# Patient Record
Sex: MALE | Race: WHITE | NOT HISPANIC OR LATINO | URBAN - METROPOLITAN AREA
[De-identification: names, ages, dates, MRNs, and addresses within clinical notes are randomized per-mention and may not be internally consistent; named-entity substitution may affect disease eponyms.]

---

## 2020-06-04 ENCOUNTER — OFFICE VISIT - RIVER FALLS (OUTPATIENT)
Dept: FAMILY MEDICINE | Facility: CLINIC | Age: 45
End: 2020-06-04

## 2020-06-12 ENCOUNTER — OFFICE VISIT - RIVER FALLS (OUTPATIENT)
Dept: FAMILY MEDICINE | Facility: CLINIC | Age: 45
End: 2020-06-12

## 2020-06-12 ENCOUNTER — COMMUNICATION - RIVER FALLS (OUTPATIENT)
Dept: FAMILY MEDICINE | Facility: CLINIC | Age: 45
End: 2020-06-12

## 2020-06-12 LAB
ANION GAP SERPL CALCULATED.3IONS-SCNC: 9 MMOL/L (ref 5–18)
BUN SERPL-MCNC: 15 MG/DL (ref 8–25)
BUN/CREAT RATIO - HISTORICAL: 18 (ref 10–20)
CALCIUM SERPL-MCNC: 8.9 MG/DL (ref 8.5–10.5)
CHLORIDE BLD-SCNC: 106 MMOL/L (ref 98–110)
CO2 SERPL-SCNC: 24 MMOL/L (ref 21–31)
CREAT SERPL-MCNC: 0.84 MG/DL (ref 0.72–1.25)
ERYTHROCYTE [DISTWIDTH] IN BLOOD BY AUTOMATED COUNT: 12.8 % (ref 11.5–15.5)
GFR ESTIMATE EXT - HISTORICAL: >60
GLUCOSE BLD-MCNC: 119 MG/DL (ref 65–100)
HCT VFR BLD AUTO: 46.2 % (ref 37–53)
HGB BLD-MCNC: 16.5 G/DL (ref 13.5–17.5)
MCH RBC QN AUTO: 31.3 PG (ref 26–34)
MCHC RBC AUTO-ENTMCNC: 35.7 G/DL (ref 32–36)
MCV RBC AUTO: 88 FL (ref 80–100)
PLATELET # BLD AUTO: 160 10*3/UL (ref 140–440)
PMV BLD: 9.9 FL (ref 6.5–11)
POTASSIUM BLD-SCNC: 4.3 MMOL/L (ref 3.5–5)
RBC # BLD AUTO: 5.28 10*6/UL (ref 4.3–5.9)
SODIUM SERPL-SCNC: 139 MMOL/L (ref 135–145)
WBC # BLD AUTO: 6.5 10*3/UL (ref 4.5–11)

## 2020-06-12 ASSESSMENT — MIFFLIN-ST. JEOR: SCORE: 1841.77

## 2020-06-15 ENCOUNTER — COMMUNICATION - RIVER FALLS (OUTPATIENT)
Dept: FAMILY MEDICINE | Facility: CLINIC | Age: 45
End: 2020-06-15

## 2021-05-14 ENCOUNTER — OFFICE VISIT - RIVER FALLS (OUTPATIENT)
Dept: FAMILY MEDICINE | Facility: CLINIC | Age: 46
End: 2021-05-14

## 2021-05-17 LAB — SARS-COV-2 RNA RESP QL NAA+PROBE: POSITIVE

## 2021-05-27 ENCOUNTER — OFFICE VISIT - RIVER FALLS (OUTPATIENT)
Dept: FAMILY MEDICINE | Facility: CLINIC | Age: 46
End: 2021-05-27

## 2021-05-28 ENCOUNTER — COMMUNICATION - RIVER FALLS (OUTPATIENT)
Dept: FAMILY MEDICINE | Facility: CLINIC | Age: 46
End: 2021-05-28

## 2021-05-28 LAB
BUN SERPL-MCNC: 16 MG/DL (ref 7–25)
BUN/CREAT RATIO - HISTORICAL: ABNORMAL (ref 6–22)
CALCIUM SERPL-MCNC: 9.2 MG/DL (ref 8.6–10.3)
CHLORIDE BLD-SCNC: 98 MMOL/L (ref 98–110)
CO2 SERPL-SCNC: 28 MMOL/L (ref 20–32)
CREAT SERPL-MCNC: 0.8 MG/DL (ref 0.6–1.35)
EGFRCR SERPLBLD CKD-EPI 2021: 109 ML/MIN/1.73M2
GLUCOSE BLD-MCNC: 131 MG/DL (ref 65–99)
POTASSIUM BLD-SCNC: 4.3 MMOL/L (ref 3.5–5.3)
SODIUM SERPL-SCNC: 136 MMOL/L (ref 135–146)

## 2021-06-10 ENCOUNTER — OFFICE VISIT - RIVER FALLS (OUTPATIENT)
Dept: FAMILY MEDICINE | Facility: CLINIC | Age: 46
End: 2021-06-10

## 2021-06-24 ENCOUNTER — OFFICE VISIT - RIVER FALLS (OUTPATIENT)
Dept: FAMILY MEDICINE | Facility: CLINIC | Age: 46
End: 2021-06-24

## 2021-07-08 ENCOUNTER — AMBULATORY - RIVER FALLS (OUTPATIENT)
Dept: FAMILY MEDICINE | Facility: CLINIC | Age: 46
End: 2021-07-08

## 2021-07-08 ENCOUNTER — OFFICE VISIT - RIVER FALLS (OUTPATIENT)
Dept: FAMILY MEDICINE | Facility: CLINIC | Age: 46
End: 2021-07-08

## 2021-07-08 ASSESSMENT — MIFFLIN-ST. JEOR: SCORE: 1898.13

## 2021-07-20 ENCOUNTER — AMBULATORY - RIVER FALLS (OUTPATIENT)
Dept: FAMILY MEDICINE | Facility: CLINIC | Age: 46
End: 2021-07-20

## 2021-07-22 ENCOUNTER — OFFICE VISIT - RIVER FALLS (OUTPATIENT)
Dept: FAMILY MEDICINE | Facility: CLINIC | Age: 46
End: 2021-07-22

## 2021-07-27 ENCOUNTER — COMMUNICATION - RIVER FALLS (OUTPATIENT)
Dept: FAMILY MEDICINE | Facility: CLINIC | Age: 46
End: 2021-07-27

## 2021-07-27 ENCOUNTER — OFFICE VISIT - RIVER FALLS (OUTPATIENT)
Dept: FAMILY MEDICINE | Facility: CLINIC | Age: 46
End: 2021-07-27

## 2021-07-27 ASSESSMENT — MIFFLIN-ST. JEOR: SCORE: 1879.99

## 2021-08-05 ENCOUNTER — OFFICE VISIT - RIVER FALLS (OUTPATIENT)
Dept: FAMILY MEDICINE | Facility: CLINIC | Age: 46
End: 2021-08-05

## 2021-08-05 ENCOUNTER — AMBULATORY - RIVER FALLS (OUTPATIENT)
Dept: FAMILY MEDICINE | Facility: CLINIC | Age: 46
End: 2021-08-05

## 2022-02-12 VITALS
SYSTOLIC BLOOD PRESSURE: 138 MMHG | DIASTOLIC BLOOD PRESSURE: 86 MMHG | BODY MASS INDEX: 30.09 KG/M2 | HEART RATE: 80 BPM | HEIGHT: 70 IN | WEIGHT: 210.2 LBS

## 2022-02-12 VITALS
BODY MASS INDEX: 30.92 KG/M2 | HEART RATE: 87 BPM | HEART RATE: 100 BPM | TEMPERATURE: 97.9 F | TEMPERATURE: 97.9 F | RESPIRATION RATE: 20 BRPM | DIASTOLIC BLOOD PRESSURE: 82 MMHG | SYSTOLIC BLOOD PRESSURE: 122 MMHG | OXYGEN SATURATION: 97 % | WEIGHT: 214 LBS | HEIGHT: 70 IN | TEMPERATURE: 98.2 F | OXYGEN SATURATION: 98 % | RESPIRATION RATE: 16 BRPM | DIASTOLIC BLOOD PRESSURE: 90 MMHG | DIASTOLIC BLOOD PRESSURE: 77 MMHG | SYSTOLIC BLOOD PRESSURE: 112 MMHG | SYSTOLIC BLOOD PRESSURE: 121 MMHG | WEIGHT: 209 LBS | HEART RATE: 94 BPM | HEART RATE: 96 BPM | HEART RATE: 91 BPM | SYSTOLIC BLOOD PRESSURE: 130 MMHG | HEART RATE: 103 BPM | DIASTOLIC BLOOD PRESSURE: 68 MMHG | RESPIRATION RATE: 20 BRPM | BODY MASS INDEX: 30.77 KG/M2 | BODY MASS INDEX: 30.72 KG/M2 | TEMPERATURE: 98.1 F | SYSTOLIC BLOOD PRESSURE: 128 MMHG | WEIGHT: 216 LBS | DIASTOLIC BLOOD PRESSURE: 80 MMHG | TEMPERATURE: 97.5 F | BODY MASS INDEX: 31.5 KG/M2 | WEIGHT: 211 LBS | OXYGEN SATURATION: 98 % | WEIGHT: 215.6 LBS | RESPIRATION RATE: 16 BRPM | WEIGHT: 216 LBS | RESPIRATION RATE: 16 BRPM | OXYGEN SATURATION: 98 % | OXYGEN SATURATION: 96 % | SYSTOLIC BLOOD PRESSURE: 128 MMHG | RESPIRATION RATE: 16 BRPM | HEART RATE: 112 BPM | SYSTOLIC BLOOD PRESSURE: 138 MMHG | DIASTOLIC BLOOD PRESSURE: 72 MMHG | WEIGHT: 220 LBS | OXYGEN SATURATION: 91 % | OXYGEN SATURATION: 97 % | HEIGHT: 70 IN | DIASTOLIC BLOOD PRESSURE: 84 MMHG

## 2022-02-12 VITALS — OXYGEN SATURATION: 88 % | RESPIRATION RATE: 22 BRPM

## 2022-02-15 NOTE — NURSING NOTE
Comprehensive Intake Entered On:  6/10/2021 10:58 AM CDT    Performed On:  6/10/2021 10:50 AM CDT by Ariela Simpson CMA               Summary   Chief Complaint :   Follow up.    Weight Measured :   211 lb(Converted to: 211 lb 0 oz, 95.708 kg)    Height/Length Estimated :   69.5 in(Converted to: 5 ft 9 in, 176.53 cm)    Systolic Blood Pressure :   112 mmHg   Diastolic Blood Pressure :   72 mmHg   Mean Arterial Pressure :   85 mmHg   Peripheral Pulse Rate :   91 bpm   BP Site :   Right arm   BP Method :   Manual   Temperature Tympanic :   98.1 DegF(Converted to: 36.7 DegC)    Respiratory Rate :   20 br/min   Oxygen Saturation :   98 %   Ariela Simpson CMA - 6/10/2021 10:50 AM CDT   Health Status   Allergies Verified? :   Yes   Medication History Verified? :   Yes   Medical History Verified? :   Yes   Pre-Visit Planning Status :   Completed   Tobacco Use? :   Former smoker   Ariela Simpson CMA - 6/10/2021 10:50 AM CDT   Consents   Consent for Immunization Exchange :   Consent Granted   Consent for Immunizations to Providers :   Consent Granted   Ariela Simpson CMA - 6/10/2021 10:50 AM CDT   Meds / Allergies   (As Of: 6/10/2021 10:58:42 AM CDT)   Allergies (Active)   No Known Medication Allergies  Estimated Onset Date:   Unspecified ; Created By:   Nery Casiano MA; Reaction Status:   Active ; Category:   Drug ; Substance:   No Known Medication Allergies ; Type:   Allergy ; Updated By:   Nery Casiano MA; Reviewed Date:   6/10/2021 10:54 AM CDT        Medication List   (As Of: 6/10/2021 10:58:42 AM CDT)   Home Meds    acetaminophen  :   acetaminophen ; Status:   Documented ; Ordered As Mnemonic:   Tylenol 325 mg oral tablet ; Simple Display Line:   650 mg, 2 tab(s), Oral, q4 hrs, PRN: for pain, 120 tab(s), 0 Refill(s) ; Catalog Code:   acetaminophen ; Order Dt/Tm:   5/27/2021 1:30:06 PM CDT          benzonatate  :   benzonatate ; Status:   Documented ; Ordered As Mnemonic:   benzonatate 100 mg oral capsule ; Simple  Display Line:   100 mg, 1 cap(s), Oral, tid, PRN: cough, 30 cap(s), 0 Refill(s) ; Catalog Code:   benzonatate ; Order Dt/Tm:   5/27/2021 1:29:39 PM CDT          calcium carbonate  :   calcium carbonate ; Status:   Documented ; Ordered As Mnemonic:   Tums ; Simple Display Line:   500 mg, Chewed, q4 hrs, PRN: indigestion, 0 Refill(s) ; Catalog Code:   calcium carbonate ; Order Dt/Tm:   5/27/2021 1:30:29 PM CDT          famotidine  :   famotidine ; Status:   Documented ; Ordered As Mnemonic:   famotidine 20 mg oral tablet ; Simple Display Line:   20 mg, 1 tab(s), Oral, bid, 180 tab(s), 0 Refill(s) ; Catalog Code:   famotidine ; Order Dt/Tm:   5/27/2021 1:29:39 PM CDT          guaiFENesin  :   guaiFENesin ; Status:   Documented ; Ordered As Mnemonic:   guaiFENesin 100 mg/5 mL oral liquid ; Simple Display Line:   100 mg, 5 mL, Oral, qid, PRN: for cough, 300 mL, 0 Refill(s) ; Catalog Code:   guaiFENesin ; Order Dt/Tm:   5/27/2021 1:31:49 PM CDT          Miscellaneous Rx Supply  :   Miscellaneous Rx Supply ; Status:   Documented ; Ordered As Mnemonic:   Home Oxygen ; Simple Display Line:   1.5 Liters per minute. Lenght of need: 2 months, 0 Refill(s) ; Catalog Code:   Miscellaneous Rx Supply ; Order Dt/Tm:   5/27/2021 1:32:05 PM CDT          zolpidem  :   zolpidem ; Status:   Documented ; Ordered As Mnemonic:   zolpidem 5 mg oral tablet ; Simple Display Line:   5 mg, 1 tab(s), Oral, hs, PRN: for sleep, 0 Refill(s) ; Catalog Code:   zolpidem ; Order Dt/Tm:   5/27/2021 1:29:39 PM CDT            ID Risk Screen   Recent Travel History :   No recent travel   Family Member Travel History :   No recent travel   Other Exposure to Infectious Disease :   Unknown   COVID-19 Testing Status :   No positive COVID-19 test   Calvin SANDHYA, Ariela - 6/10/2021 10:50 AM CDT

## 2022-02-15 NOTE — NURSING NOTE
Comprehensive Intake Entered On:  7/22/2021 1:27 PM CDT    Performed On:  7/22/2021 1:21 PM CDT by Ariela Simpson CMA               Summary   Chief Complaint :   Post covid follow up. Discuss going back to work. Ongoing SOB   Menstrual Status :   N/A   Weight Measured :   216 lb(Converted to: 216 lb 0 oz, 97.976 kg)    Height/Length Estimated :   69.5 in(Converted to: 5 ft 9 in, 176.53 cm)    Systolic Blood Pressure :   128 mmHg   Diastolic Blood Pressure :   84 mmHg (HI)    Mean Arterial Pressure :   99 mmHg   Peripheral Pulse Rate :   100 bpm   BP Site :   Right arm   BP Method :   Manual   Temperature Tympanic :   98.2 DegF(Converted to: 36.8 DegC)    Respiratory Rate :   16 br/min   Oxygen Saturation :   98 %   Ariela Simpson CMA - 7/22/2021 1:21 PM CDT   Health Status   Allergies Verified? :   Yes   Medication History Verified? :   Yes   Medical History Verified? :   Yes   Pre-Visit Planning Status :   Completed   Tobacco Use? :   Former smoker   Ariela Simpson CMA - 7/22/2021 1:21 PM CDT   Consents   Consent for Immunization Exchange :   Consent Granted   Consent for Immunizations to Providers :   Consent Granted   Ariela Simpson CMA - 7/22/2021 1:21 PM CDT   Meds / Allergies   (As Of: 7/22/2021 1:27:04 PM CDT)   Allergies (Active)   No Known Medication Allergies  Estimated Onset Date:   Unspecified ; Created By:   Nery Casiano MA; Reaction Status:   Active ; Category:   Drug ; Substance:   No Known Medication Allergies ; Type:   Allergy ; Updated By:   Nery Casiano MA; Reviewed Date:   7/22/2021 1:23 PM CDT        Medication List   (As Of: 7/22/2021 1:27:04 PM CDT)   Prescription/Discharge Order    Miscellaneous Rx Supply  :   Miscellaneous Rx Supply ; Status:   Prescribed ; Ordered As Mnemonic:   D/C Oxygen ; Simple Display Line:   See Instructions, DISCONTINUE Oxygen, 1 EA, 0 Refill(s) ; Ordering Provider:   Carl Ron MD; Catalog Code:   Miscellaneous Rx Supply ; Order Dt/Tm:   7/8/2021  9:57:41 AM CDT

## 2022-02-15 NOTE — PROGRESS NOTES
Patient:   RACHEL NEVILLE            MRN: [id number]            FIN: [id number]               Age:   46 years     Sex:  Male     :  1975   Associated Diagnoses:   Cold virus; Suspected COVID-19 virus infection   Author:   Carl Ron MD      Visit Information      Date of Service: 2021 08:53 am  Performing Location: [facility]  Encounter#: [id number]      Primary Care Provider (PCP):  Carl Ron MD    NPI# [id number]      Referring Provider:  Carl Ron MD    NPI# [id number]      Chief Complaint   2021 9:02 AM CDT    Insomnia x6 days. Left side sciatic pain. Multiple covid symptoms marked on screening form.     Chief complaint and symptoms noted above confirmed with patient.      History of Present Illness             The patient presents with nasal congestion.  The location is both sides.  There were exacerbating factors.  It is described as a sensation of pressure.  The symptom occurs intermittently.  The course is worsening.        Review of Systems   Constitutional:  Fever.    Ear/Nose/Mouth/Throat:  Nasal congestion.    Respiratory:  Cough, No wheezing.       Health Status   Allergies:    Allergic Reactions (Selected)  No Known Medication Allergies   Medications:  (Selected)      Problem list:    All Problems  Obesity / SNOMED CT 3848997668 / Probable  Resolved: Tobacco user / ICD-9-.1      Histories   Past Medical History:    Resolved  Tobacco user (ICD-9-.1):  Resolved on 2012 at 37 years.   Family History:    Cancer  Grandmother (M)  [place] - Lung cancer  Brother ()     Procedure history:    L Femur Repair on 2004 at 28 Years.   Social History:        Electronic Cigarette/Vaping Assessment            Electronic Cigarette Use: Never.      Alcohol Assessment            1-2 times per week, 4 drinks/episode average.      Tobacco Assessment: Current            Current, Snuff, Started age 18 Years.            Former  smoker, quit more than 30 days ago      Substance Abuse Assessment: Denies Substance Abuse            Never      Employment and Education Assessment            Employed, Work/School description: .      Home and Environment Assessment            Marital status: .  Spouse/Partner name: Izabella.  Lives with Children, Spouse.  1 children.  Living               situation: Home/Independent.  Smoker in household: Yes.      Nutrition and Health Assessment            Type of diet: Regular.  Caffeine intake amount: Coffee.      Exercise and Physical Activity Assessment: Regular exercise      Sexual Assessment            Sexually active: Yes.  Sexual orientation: Heterosexual.  Other contraceptive use: IUD.        Physical Examination   Documented vital signs:          Pulse: 120  beats per min.         Respiration: 22  breaths/ min.         Oxygen saturation  88  %: Room air.    General:  Alert and oriented.    HENT:  Normocephalic.    Neck:  Supple, Non-tender, No lymphadenopathy.    Respiratory:  Lungs are clear to auscultation.    Cardiovascular:  Normal rate, Regular rhythm.       Impression and Plan   Diagnosis     Cold virus (UBX33-UB J00).     Suspected COVID-19 virus infection (DGX71-NM Z20.822).     Course:  Worsening.    Orders     Orders (Selected)   Outpatient Orders  Ordered  SARS-CoV-2 RNA (COVID-19), Qualitative NAAT (Request): Screening for viral disease.     Will send to [facility] ER for further W/U.  Wife will drive..        Professional Services   Counseling Summary:  This was a 30 minute visit with greater than 50% of that time spent counseling the patient, and coordination of care..    Counseling included abnormal lab results, differential diagnoses, and treatment options.    The patient was interactive, attentive, asked questions, and verbalized understanding.    Family present included spouse.

## 2022-02-15 NOTE — LETTER
(Inserted Image. Unable to display)   July 12, 2021    RACHEL NEVILLE  [address] [place] [address]            Dear RACHEL,      Thank you for selecting [facility] for your healthcare needs.    Our records indicate you are due for the following services:     Annual Physical.    (FYI   Regarding office visits: In some instances, a video visit or telephone visit may be offered as an option.)      To schedule an appointment or if you have further questions, please contact your clinic at [phone].      Powered by [employer]    Sincerely,    Carl Ron MD

## 2022-02-15 NOTE — LETTER
(Inserted Image. Unable to display)   [address] [place] [address] [phone] (phone)  [phone] (fax)  RACHEL NEVILLE    [address] [facility] [place] [address]        Dear RACHEL,    Thank you for selecting our practice as your care provider. Below you will find the results and recent diagnostic testings outcomes we have reviewed.        These results look good!  Please keep scheduled follow up appointments.        Result Name Current Result Previous Result Reference Range   Sodium Level (mmol/L)  136 5/27/2021  139 6/12/2020 135 - 146   Potassium Level (mmol/L)  4.3 5/27/2021  4.3 6/12/2020 3.5 - 5.3   Chloride Level (mmol/L)  98 5/27/2021  106 6/12/2020 98 - 110   CO2 Level (mmol/L)  28 5/27/2021  24 6/12/2020 20 - 32   Glucose Level (mg/dL) ((H)) 131 5/27/2021 ((H)) 119 6/12/2020 65 - 99   BUN (mg/dL)  16 5/27/2021  15 6/12/2020 7 - 25   Creatinine Level (mg/dL)  0.80 5/27/2021  0.84 6/12/2020 0.60 - 1.35   eGFR (mL/min/1.73m2)  109 5/27/2021  > OR = 60 -    Calcium Level (mg/dL)  9.2 5/27/2021  8.9 6/12/2020 8.6 - 10.3       Please contact my practice at the number listed above if you have any questions or concerns.     Sincerely,        Carl Ron MD, FAAFP      What do your labs mean? Below is a glossary of commonly ordered labs:  LDL - Bad Cholesterol HDL - Good Cholesterol AST/ALT - Liver Function  PSA -  Prostate  TSH - Thyroid  Hgb (Hemoglobin) - Red Blood Cells  Cr/Creatinine/Microalbumin/ BUN/eGFR - Kidney Function HgbA1c - Diabetes Test

## 2022-02-15 NOTE — PROGRESS NOTES
Patient:   RACHEL NEVILLE            MRN: [id number]            FIN: [id number]               Age:   46 years     Sex:  Male     :  1975   Associated Diagnoses:   History of COVID-19; Muscular deconditioning   Author:   Carl Ron MD      Visit Information      Date of Service: 2021 09:34 am  Performing Location: [facility]  Encounter#: [id number]      Primary Care Provider (PCP):  Carl Ron MD    NPI# [id number]      Referring Provider:  Carl Ron MD    NPI# [id number]      Chief Complaint   2021 9:37 AM CDT     Post covid follow up   Chief complaint and symptoms noted above confirmed with patient.      History of Present Illness             The patient presents with symptoms of an upper respiratory infection and F/U Covid, Improving.  The symptoms of the upper respiratory infection are described as rhinorrhea, cough and sputum production.  The severity of the symptoms associated to the upper respiratory infection is mild.  The context of the upper respiratory infection symptoms: occurred after infectious exposure.  Hospitalized May 14th-..  Exacerbating factors consist of none.  Relieving factors consist of rest, fluids and Stopped using oxygen..        Review of Systems   Constitutional:  Fever.    Ear/Nose/Mouth/Throat:  Nasal congestion.    Respiratory:  Cough, No wheezing.       Health Status   Allergies:    Allergic Reactions (Selected)  No Known Medication Allergies   Problem list:    All Problems (Selected)  Obesity / SNOMED CT 2542862162 / Probable  History of COVID-19 / SNOMED CT 3586526785 / Confirmed      Histories   Past Medical History:    Resolved  Inpatient stay (SNOMED CT 817686068): Onset on 5/15/2021 at 46 years.  Resolved on 2021 at 46 years.  Comments:  2021 CDT 10:58 AM CDT - Melinda Catalina  [facility], [place] - Acute hypoxemic respiratory failure due to COVID-19.  Tobacco user (ICD-9-.1):  Resolved on  2012 at 37 years.   Family History:    Cancer  Grandmother (M)  [place] - Lung cancer  Brother ()     Procedure history:    L Femur Repair on 2004 at 28 Years.   Social History:        Electronic Cigarette/Vaping Assessment            Electronic Cigarette Use: Never.      Alcohol Assessment            1-2 times per week, 4 drinks/episode average.      Tobacco Assessment: Current            Current, Snuff, Started age 18 Years.            Former smoker, quit more than 30 days ago      Substance Abuse Assessment: Denies Substance Abuse            Never      Employment and Education Assessment            Employed, Work/School description: .      Home and Environment Assessment            Marital status: .  Spouse/Partner name: Izabella.  Lives with Children, Spouse.  1 children.  Living               situation: Home/Independent.  Smoker in household: Yes.      Nutrition and Health Assessment            Type of diet: Regular.  Caffeine intake amount: Coffee.      Exercise and Physical Activity Assessment: Regular exercise      Sexual Assessment            Sexually active: Yes.  Sexual orientation: Heterosexual.  Other contraceptive use: IUD.        Physical Examination   Vital Signs   2021 9:37 AM CDT Temperature Tympanic 97.5 DegF  LOW    Peripheral Pulse Rate 103 bpm  HI    Respiratory Rate 16 br/min    Systolic Blood Pressure 128 mmHg    Diastolic Blood Pressure 82 mmHg  HI    Mean Arterial Pressure 97 mmHg    BP Site Right arm    BP Method Manual    Oxygen Saturation 97 %      Measurements from flowsheet : Measurements   2021 9:37 AM CDT Height Measured - Standard 70.25 in    Weight Measured - Standard 220 lb    BSA 2.22 m2    Body Mass Index 31.34 kg/m2  HI      Walked and sats went into high 80's   General:  Alert and oriented.    HENT:  Normocephalic.    Neck:  Supple, Non-tender, No lymphadenopathy.    Respiratory:  Lungs are clear to auscultation.    Cardiovascular:  Normal rate,  Regular rhythm.       Impression and Plan   Diagnosis     History of COVID-19 (LBY52-AT Z86.16).     Muscular deconditioning (STE43-UV R29.898).     Course:  Progressing as expected.    Orders     Orders   Pharmacy:  D/C Oxygen (Prescribe): D/C Oxygen, See Instructions, Instructions: DISCONTINUE Oxygen, Supply, # 1 EA, 0 Refill(s), Type: Maintenance.       Will do 3 weeks of PT as recommended by [employer] with back injury.  Expected date of RTW 7/26/2021.     Orders     Orders   Requests (Return to Office):  Return to Clinic (Request) (Order): Return in 2 weeks.        Professional Services   Counseling Summary:  This was a 30 minute visit with greater than 50% of that time spent counseling the patient, and coordination of care..    Counseling included treatment options.    The patient was interactive, attentive, and asked questions.

## 2022-02-15 NOTE — TELEPHONE ENCOUNTER
---------------------  From: Denise Buckner   To: Edd Marie MD;     Sent: 7/21/2021 10:09:31 AM CDT  Subject: HST     Patient has a home sleep study uploaded into [employer] ready for interpretation.---------------------  From: Edd Marie MD   To: Denise Buckner;     Sent: 7/21/2021 1:18:43 PM CDT  Subject: RE: HST     done

## 2022-02-15 NOTE — PROGRESS NOTES
Patient:   RACHEL NEVILLE            MRN: [id number]            FIN: [id number]               Age:   45 years     Sex:  Male     :  1975   Associated Diagnoses:   Herniated nucleus pulposus, L4-5   Author:   Carl Ron MD      Preoperative Information   Indication for surgery:  Musculoskeletal disorder, Herniated disc.    Accompanied by:  No one.    Source of history:  Self.           Chief Complaint   2020 8:34 AM CDT    Pre-op, Disk repair in back     Chief complaint and symptoms noted above confirmed with patient.      Review of Systems   Constitutional:  Negative.    Eye:  Negative.    Ear/Nose/Mouth/Throat:  Negative.    Respiratory:  Negative.    Cardiovascular:  Negative.    Gastrointestinal:  Negative.    Genitourinary:  Negative.    Hematology/Lymphatics:  Negative.    Endocrine:  Negative.    Immunologic:  Negative.    Musculoskeletal:       Back pain: On the left side, numbness left lower leg x 3 weeks.  Sharp pain.  Hx of herniated disc..    Integumentary:  Negative.    Neurologic:  Negative.    Psychiatric:  Negative.       Health Status   Allergies:    Allergic Reactions (Selected)  No Known Medication Allergies   Medications:  (Selected)      Problem list:    All Problems  Obesity / SNOMED CT 8444566930 / Probable  Tobacco user / ICD-9-.1 / Probable      Histories   Past Medical History:    Active  Tobacco user (ICD-9-.1)   Family History:    Cancer  Grandmother (M)  [place] - Lung cancer  Brother ()     Procedure history:    L Femur Repair on 2004 at 28 Years.   Social History:        Alcohol Assessment            1-2 times per week, 4 drinks/episode average.      Tobacco Assessment: Current            Current, Snuff, Started age 18 Years.                     Comments:                      2012 - Chyna Zavala CMA                     Had stopped for 7 years & restarted      Substance Abuse Assessment: Denies Substance Abuse            Never       Employment and Education Assessment            Employed, Work/School description: [employer].      Home and Environment Assessment            Marital status: .  Spouse/Partner name: Izabella.  Lives with Children, Spouse.  1 children.  Living               situation: Home/Independent.  Smoker in household: Yes.      Nutrition and Health Assessment            Type of diet: Regular.  Caffeine intake amount: Coffee.      Exercise and Physical Activity Assessment: Regular exercise      Sexual Assessment            Sexually active: Yes.  Sexual orientation: Heterosexual.  Other contraceptive use: IUD.       Has no history of anemia.  Has no history of DVT or pulmonary embolism.  Has no personal history of bleeding problems.   Has no personal or family history of anesthesia reactions.  Patient does not have active tuberculosis.    S/he has not taken aspirin or aspirin containing products in the last week.     S/he has not taken Plavix (Clopidogrel) in the last 2 weeks.    S/he has not taken warfarin in the past week.    S/he has been on corticosteroids for 2 weeks recently.      S/he is not DNR before, during or after surgery.    Chest pain / SOB walking up 2 flights of steps? NO  Pain in neck or jaw? NO  CAD MI?  NO  Afib? NO  Heart Failure? NO  Asthma  or Bronchitis? NO   Diabetes? NO       Insulin/Orals?   Seizure Disorder? NO  CKD? NO  Thyroid Disease? NO  Liver Disease NO  CVA? NO         Physical Examination   Vital Signs   6/12/2020 8:34 AM CDT Peripheral Pulse Rate 80 bpm    Pulse Site Radial artery    HR Method Manual    Systolic Blood Pressure 138 mmHg    Diastolic Blood Pressure 86 mmHg    Mean Arterial Pressure 103 mmHg    BP Site Right arm    BP Method Manual      General:  Alert and oriented, No acute distress.    Eye:  Normal conjunctiva.    HENT:  Normocephalic.    Neck:  Supple.    Respiratory:  Lungs are clear to auscultation, Respirations are non-labored.    Cardiovascular:  Normal rate, Regular  rhythm, No murmur.    Gastrointestinal:  Soft, Non-tender, Non-distended.    Genitourinary:  No costovertebral angle tenderness.    Musculoskeletal:  Normal range of motion, Normal strength.    Neurologic:       Sensory: Left, Lower extremity, Light touch, Diminished.       Review / Management   Radiology results   Magnetic Resonance Imaging, Interpretation:  L/Spine    CONCLUSION:  Multilevel spondylosis, no fractures or destructive lesions and  significant findings are as follows:  1.  Left disc extrusion at L4-5 compresses the left L5 root.  2.  Bulge/protrusion at L3-4 causes mild central and moderate subarticular  stenosis with L4 impingement.  3.  Central chronic protrusion at L5-S1 without central or lateral stenosis.  4.  Multilevel hypertrophic facet joint degeneration, as reported.         Impression and Plan   Diagnosis     Herniated nucleus pulposus, L4-5 (SQG43-MM M51.26).     Condition:  Stable.    Orders     Orders   Requests (Lab):  Basic Metabolic Panel (Request) (Order): Priority: Urgent, Herniated nucleus pulposus, L4-5  CBC w/o Diff (Request) (Order): Priority: Urgent, Herniated nucleus pulposus, L4-5.     Patient Instructions:  May proceed with anticipated surgery, risk vs benefits discussed.  OK for general endotracheal anesthesia., Pending Lab Work.

## 2022-02-15 NOTE — NURSING NOTE
Comprehensive Intake Entered On:  5/27/2021 5:21 PM CDT    Performed On:  5/27/2021 5:12 PM CDT by May Gillespie               Summary   Chief Complaint :   hospital visit f/u    Weight Measured :   209 lb(Converted to: 209 lb 0 oz, 94.801 kg)    Systolic Blood Pressure :   138 mmHg (HI)    Diastolic Blood Pressure :   80 mmHg   Mean Arterial Pressure :   99 mmHg   Peripheral Pulse Rate :   87 bpm   Respiratory Rate :   16 br/min   Oxygen Saturation :   98 %   May Gillespie - 5/27/2021 5:12 PM CDT   Health Status   Allergies Verified? :   Yes   Medication History Verified? :   Yes   Tobacco Use? :   Former smoker   May Gillespie - 5/27/2021 5:12 PM CDT   Consents   Consent for Immunization Exchange :   Consent Granted   Consent for Immunizations to Providers :   Consent Granted   May Gillespie - 5/27/2021 5:12 PM CDT   Meds / Allergies   (As Of: 5/27/2021 5:21:17 PM CDT)   Allergies (Active)   No Known Medication Allergies  Estimated Onset Date:   Unspecified ; Created By:   Nery Casiano MA; Reaction Status:   Active ; Category:   Drug ; Substance:   No Known Medication Allergies ; Type:   Allergy ; Updated By:   Nery Casiano MA; Reviewed Date:   5/14/2021 9:04 AM CDT        Medication List   (As Of: 5/27/2021 5:21:17 PM CDT)   Home Meds    acetaminophen  :   acetaminophen ; Status:   Documented ; Ordered As Mnemonic:   Tylenol 325 mg oral tablet ; Simple Display Line:   650 mg, 2 tab(s), Oral, q4 hrs, PRN: for pain, 120 tab(s), 0 Refill(s) ; Catalog Code:   acetaminophen ; Order Dt/Tm:   5/27/2021 1:30:06 PM CDT          benzonatate  :   benzonatate ; Status:   Documented ; Ordered As Mnemonic:   benzonatate 100 mg oral capsule ; Simple Display Line:   100 mg, 1 cap(s), Oral, tid, PRN: cough, 30 cap(s), 0 Refill(s) ; Catalog Code:   benzonatate ; Order Dt/Tm:   5/27/2021 1:29:39 PM CDT          calcium carbonate  :   calcium carbonate ; Status:   Documented ; Ordered As Mnemonic:   Tums ; Simple  Display Line:   500 mg, Chewed, q4 hrs, PRN: indigestion, 0 Refill(s) ; Catalog Code:   calcium carbonate ; Order Dt/Tm:   5/27/2021 1:30:29 PM CDT          famotidine  :   famotidine ; Status:   Documented ; Ordered As Mnemonic:   famotidine 20 mg oral tablet ; Simple Display Line:   20 mg, 1 tab(s), Oral, bid, 180 tab(s), 0 Refill(s) ; Catalog Code:   famotidine ; Order Dt/Tm:   5/27/2021 1:29:39 PM CDT          guaiFENesin  :   guaiFENesin ; Status:   Documented ; Ordered As Mnemonic:   guaiFENesin 100 mg/5 mL oral liquid ; Simple Display Line:   100 mg, 5 mL, Oral, qid, PRN: for cough, 300 mL, 0 Refill(s) ; Catalog Code:   guaiFENesin ; Order Dt/Tm:   5/27/2021 1:31:49 PM CDT          Miscellaneous Rx Supply  :   Miscellaneous Rx Supply ; Status:   Documented ; Ordered As Mnemonic:   Home Oxygen ; Simple Display Line:   3 Liters per minute. Lenght of need: 2 months, 0 Refill(s) ; Catalog Code:   Miscellaneous Rx Supply ; Order Dt/Tm:   5/27/2021 1:32:05 PM CDT          zolpidem  :   zolpidem ; Status:   Documented ; Ordered As Mnemonic:   zolpidem 5 mg oral tablet ; Simple Display Line:   5 mg, 1 tab(s), Oral, hs, PRN: for sleep, 0 Refill(s) ; Catalog Code:   zolpidem ; Order Dt/Tm:   5/27/2021 1:29:39 PM CDT            ID Risk Screen   Recent Travel History :   No recent travel   Family Member Travel History :   No recent travel   Other Exposure to Infectious Disease :   Unknown   COVID-19 Testing Status :   Positive COVID-19 test in the last 30 days   May Gillespie - 5/27/2021 5:12 PM CDT   Social History   Social History   (As Of: 5/27/2021 5:21:17 PM CDT)   Alcohol:        1-2 times per week, 4 drinks/episode average.   (Last Updated: 5/8/2012 8:57:32 AM CDT by Catalina Lawrence)          Tobacco:  Current      Current, Snuff, Started age 18 Years.   Comments:  5/3/2012 3:29 PM - Chyna Zavala CMA: Had stopped for 7 years & restarted   (Last Updated: 5/3/2012 3:29:15 PM CDT by Chyna Zavala CMA)   Former smoker,  quit more than 30 days ago   (Last Updated: 5/14/2021 9:03:58 AM CDT by Ariela Simpson CMA)          Electronic Cigarette/Vaping:        Electronic Cigarette Use: Never.   (Last Updated: 5/14/2021 9:04:02 AM CDT by Ariela Simpson CMA)          Substance Abuse:  Denies Substance Abuse      Never   (Last Updated: 5/8/2012 8:57:40 AM CDT by Catalina Lawrence)          Employment/School:        Employed, Work/School description: .   (Last Updated: 5/3/2012 3:29:24 PM CDT by Chyna Zavala CMA)          Home/Environment:        Marital status: .  Spouse/Partner name: Izabella.  Lives with Children, Spouse.  1 children.  Living situation: Home/Independent.  Smoker in household: Yes.   (Last Updated: 5/3/2012 3:29:57 PM CDT by Chyna Zavala CMA)          Nutrition/Health:        Type of diet: Regular.  Caffeine intake amount: Coffee.   (Last Updated: 5/3/2012 3:30:16 PM CDT by Chyna Zavala CMA)          Exercise:  Regular exercise      (Last Updated: 5/3/2012 3:30:23 PM CDT by Chyna Zavala CMA )         Sexual:        Sexually active: Yes.  Sexual orientation: Heterosexual.  Other contraceptive use: IUD.   (Last Updated: 5/8/2012 8:58:10 AM CDT by Catalina Lawrence)

## 2022-02-15 NOTE — TELEPHONE ENCOUNTER
---------------------  From: Stephanie Webber LPN (Phone Messages Pool (32224_[facility]))   To: [facility] Message Pool ([facility]);     Sent: 6/12/2020 11:33:50 AM CDT  Subject: General Message     Phone Message    PCP:   CHT      Time of Call:  10:30am       Person Calling:  pt  Phone number:  [phone]    Note:   Pt LM stating he had an appt this morning with [facility]. Pt needing the paperwork from today faxed to the [place] office. Fax [phone]    Last office visit and reason:  6/12/20 [facility] General Preoperative Note/Herniated DiscFaxed to number providedTeri from [facility] at 1:58pm stating they need pt's preop, labs and EKG faxed over to [phone].  If questions call [phone]    Called surgery center- they have not received anything. Please fax again.---------------------  From: Stephanie Webber LPN (Phone Messages Pool (32224_[place] [facility]))   To: [facility] Message Pool ([facility]);     Sent: 6/12/2020 2:19:11 PM CDT  Subject: FW: General MessageCalled [place] to get this faxed ASAP

## 2022-02-15 NOTE — TELEPHONE ENCOUNTER
---------------------  From: Edd Marie MD   To: Sleep Message Pool (32224_[place] [facility]);     Sent: 7/27/2021 4:21:07 PM CDT  Subject: General Message     AutoPAP setup with download in 30 and 90 days.Spoke with patient he is interested in going through [facility]. Patient's information has been sent.

## 2022-02-15 NOTE — PROGRESS NOTES
Patient:   RACHEL NEVILLE            MRN: [id number]            FIN: [id number]               Age:   46 years     Sex:  Male     :  1975   Associated Diagnoses:   History of COVID-19   Author:   Carl Ron MD      Visit Information      Date of Service: 2021 05:08 pm  Performing Location: [facility]  Encounter#: [id number]      Primary Care Provider (PCP):  Carl Ron MD    NPI# [id number]      Referring Provider:  Carl Ron MD    NPI# [id number]      Chief Complaint   2021 5:12 PM CDT    hospital visit f/u     Chief complaint and symptoms noted above confirmed with patient.      History of Present Illness             The patient presents with nasal congestion.  The location is both sides.  There were exacerbating factors.  It is described as a sensation of pressure.  The symptom occurs intermittently.  The course is worsening.        Review of Systems   Constitutional:  Fever.    Ear/Nose/Mouth/Throat:  Nasal congestion.    Respiratory:  Cough, No wheezing.       Health Status   Allergies:    Allergic Reactions (Selected)  No Known Medication Allergies   Medications:  (Selected)   Documented Medications  Documented  Home Oxygen: Home Oxygen, Instructions: 3 Liters per minute. Lenght of need: 2 months, 0 Refill(s), Type: Maintenance  Tums: ( 500 mg ), Chewed, q4 hrs, PRN: indigestion, 0 Refill(s), Type: Maintenance  Tylenol 325 mg oral tablet: = 2 tab(s) ( 650 mg ), Oral, q4 hrs, PRN: for pain, # 120 tab(s), 0 Refill(s), Type: Maintenance  benzonatate 100 mg oral capsule: = 1 cap(s) ( 100 mg ), Oral, tid, PRN: cough, # 30 cap(s), 0 Refill(s), Type: Maintenance  famotidine 20 mg oral tablet: = 1 tab(s) ( 20 mg ), Oral, bid, # 180 tab(s), 0 Refill(s), Type: Maintenance  guaiFENesin 100 mg/5 mL oral liquid: = 5 mL ( 100 mg ), Oral, qid, PRN: for cough, # 300 mL, 0 Refill(s), Type: Maintenance  zolpidem 5 mg oral tablet: = 1 tab(s) ( 5 mg ), Oral,  hs, PRN: for sleep, 0 Refill(s), Type: Maintenance   Problem list:    All Problems  Obesity / SNOMED CT 9456434189 / Probable  Resolved: Tobacco user / ICD-9-.1  Resolved: Inpatient stay / SNOMED CT 818522973      Histories   Past Medical History:    Resolved  Inpatient stay (SNOMED CT 483041528): Onset on 5/15/2021 at 46 years.  Resolved on 2021 at 46 years.  Comments:  2021 CDT 10:58 AM CDT - [facility], [place] - Acute hypoxemic respiratory failure due to COVID-19.  Tobacco user (ICD-9-.1):  Resolved on 2012 at 37 years.   Family History:    Cancer  Grandmother (M)  [place] - Lung cancer  Brother ()     Procedure history:    L Femur Repair on 2004 at 28 Years.   Social History:        Electronic Cigarette/Vaping Assessment            Electronic Cigarette Use: Never.      Alcohol Assessment            1-2 times per week, 4 drinks/episode average.      Tobacco Assessment: Current            Current, Snuff, Started age 18 Years.            Former smoker, quit more than 30 days ago      Substance Abuse Assessment: Denies Substance Abuse            Never      Employment and Education Assessment            Employed, Work/School description: .      Home and Environment Assessment            Marital status: .  Spouse/Partner name: Izabella.  Lives with Children, Spouse.  1 children.  Living               situation: Home/Independent.  Smoker in household: Yes.      Nutrition and Health Assessment            Type of diet: Regular.  Caffeine intake amount: Coffee.      Exercise and Physical Activity Assessment: Regular exercise      Sexual Assessment            Sexually active: Yes.  Sexual orientation: Heterosexual.  Other contraceptive use: IUD.        Physical Examination   Vital Signs   2021 5:12 PM CDT Peripheral Pulse Rate 87 bpm    Respiratory Rate 16 br/min    Systolic Blood Pressure 138 mmHg  HI    Diastolic Blood Pressure 80 mmHg    Mean Arterial  Pressure 99 mmHg    Oxygen Saturation 98 %      Measurements from flowsheet : Measurements   5/27/2021 5:12 PM CDT    Weight Measured - Standard                209 lb     General:  Alert and oriented.    HENT:  Normocephalic.    Neck:  Supple, Non-tender, No lymphadenopathy.    Respiratory:  Lungs are clear to auscultation.    Cardiovascular:  Normal rate, Regular rhythm.       Impression and Plan   Diagnosis     History of COVID-19 (BAQ37-OF Z86.16).     Course:  Progressing as expected.    Orders     Orders   Requests (Consults / Referrals):  Referral (Request) (Order): 5/27/2021 5:45 PM CDT, Referred to: Respiratory Therapy, Referred to: [facility], Reason for referral: Wean off O2 from Covid Infection, History of COVID-19.

## 2022-02-15 NOTE — PROGRESS NOTES
Patient:   RACHEL NEVILLE            MRN: [id number]            FIN: [id number]               Age:   46 years     Sex:  Male     :  1975   Associated Diagnoses:   History of COVID-19; Muscular deconditioning   Author:   Carl Ron MD      Visit Information      Date of Service: 2021 01:34 pm  Performing Location: [facility]  Encounter#: [id number]      Primary Care Provider (PCP):  Carl Ron MD    NPI# [id number]      Referring Provider:  Carl Ron MD, NPI# [id number]      Chief Complaint   2021 1:41 PM CDT     Post covid follow up.     Chief complaint and symptoms noted above confirmed with patient.      History of Present Illness             The patient presents with symptoms of an upper respiratory infection.  The timing/course of upper respiratory infection symptoms has resolved.        Review of Systems   Constitutional:  Fever.    Ear/Nose/Mouth/Throat:  Nasal congestion.    Respiratory:  Cough, No wheezing.       Health Status   Allergies:    Allergic Reactions (Selected)  No Known Medication Allergies   Medications:  (Selected)   Prescriptions  Prescribed  AUTOPAP 6-20 cm water pressure: AUTOPAP 6-20 cm water pressure, See Instructions, Instructions: Heated humidifier, heated tubing, filters, nasal or full face mask of choice with headgear.  Replacement cushions and supplies as needed.  Change supplies every 6 mos  Months = 99 (Lifeti...  D/C Oxygen: D/C Oxygen, See Instructions, Instructions: DISCONTINUE Oxygen, Supply, # 1 EA, 0 Refill(s), Type: Maintenance   Problem list:    All Problems (Selected)  Obstructive sleep apnea syndrome, severe / SNOMED CT 601794426 / Confirmed  Obesity / SNOMED CT 2098430750 / Probable  History of COVID-19 / SNOMED CT 2320950874 / Confirmed      Histories   Past Medical History:    Resolved  Inpatient stay (SNOMED CT 761987193): Onset on 5/15/2021 at 46 years.  Resolved on 2021 at 46  years.  Comments:  2021 CDT 10:58 AM CDT - Catalina Lawrence  [facility], [place] - Acute hypoxemic respiratory failure due to COVID-19.  Tobacco user (ICD-9-.1):  Resolved on 2012 at 37 years.   Family History:    Cancer  Grandmother (M)  Sleep apnea syndrome  Father  [place] - Lung cancer  Brother ()  Atrial fibrillation  Father     Procedure history:    L Femur Repair on 2004 at 28 Years.   Social History:        Electronic Cigarette/Vaping Assessment            Electronic Cigarette Use: Never.      Alcohol Assessment            1-2 times per week, 4 drinks/episode average.      Tobacco Assessment: Current            Current, Snuff, Started age 18 Years.            Former smoker, quit more than 30 days ago      Substance Abuse Assessment: Denies Substance Abuse            Never      Employment and Education Assessment            Employed, Work/School description: .      Home and Environment Assessment            Marital status: .  Spouse/Partner name: Izabella.  Lives with Children, Spouse.  1 children.  Living               situation: Home/Independent.  Smoker in household: Yes.      Nutrition and Health Assessment            Type of diet: Regular.  Caffeine intake amount: Coffee.      Exercise and Physical Activity Assessment: Regular exercise      Sexual Assessment            Sexually active: Yes.  Sexual orientation: Heterosexual.  Other contraceptive use: IUD.        Physical Examination   Vital Signs   2021 1:41 PM CDT Temperature Tympanic 97.9 DegF    Peripheral Pulse Rate 94 bpm    Respiratory Rate 20 br/min    Systolic Blood Pressure 122 mmHg    Diastolic Blood Pressure 90 mmHg  HI    Mean Arterial Pressure 101 mmHg    BP Site Right arm    BP Method Manual    Oxygen Saturation 97 %      Measurements from flowsheet : Measurements   2021 1:41 PM CDT Height/Length Estimated 69.5 in    Weight Measured - Standard 215.6 lb      Walked and sats stay in Mid 90's   General:   Alert and oriented.    HENT:  Normocephalic.    Neck:  Supple, Non-tender, No lymphadenopathy.    Respiratory:  Lungs are clear to auscultation.    Cardiovascular:  Normal rate, Regular rhythm.       Review / Management   Documentation reviewed:  Records from referring facility, Records from referring physician, Will be getting CT and PFT's .       Impression and Plan   Diagnosis     History of COVID-19 (YRL95-HP Z86.16).     Muscular deconditioning (LTT65-HK R29.898).     Course:  Progressing as expected.    Orders     Will get PFT's and CT.  F/U per Pulmonary..     Orders      Professional Services   Counseling Summary:  This was a 30 minute visit with greater than 50% of that time spent counseling the patient, and coordination of care..    Counseling included treatment options.    The patient was interactive, attentive, and asked questions.

## 2022-02-15 NOTE — PROGRESS NOTES
Patient:   RACHEL NEVILLE            MRN: [id number]            FIN: [id number]               Age:   46 years     Sex:  Male     :  1975   Associated Diagnoses:   History of COVID-19; Muscular deconditioning   Author:   Carl Ron MD      Visit Information      Date of Service: 2021 01:10 pm  Performing Location: [facility]  Encounter#: [id number]      Primary Care Provider (PCP):  Carl Ron MD    NPI# [id number]      Referring Provider:  Carl Ron MD    NPI# [id number]      Chief Complaint   2021 1:21 PM CDT    Post covid follow up. Discuss going back to work. Ongoing SOB     Chief complaint and symptoms noted above confirmed with patient.      History of Present Illness             The patient presents with symptoms of an upper respiratory infection.  The symptoms of the upper respiratory infection are described as rhinorrhea and sore throat.  The severity of the symptoms associated to the upper respiratory infection is moderate.  The timing/course of upper respiratory infection symptoms is constant.  Exacerbating factors consist of none.  Relieving factors consist of none.  Associated symptoms consist of none.        Review of Systems   Constitutional:  Fever.    Ear/Nose/Mouth/Throat:  Nasal congestion.    Respiratory:  Cough, No wheezing.       Health Status   Allergies:    Allergic Reactions (Selected)  No Known Medication Allergies   Medications:  (Selected)   Prescriptions  Prescribed  D/C Oxygen: D/C Oxygen, See Instructions, Instructions: DISCONTINUE Oxygen, Supply, # 1 EA, 0 Refill(s), Type: Maintenance   Problem list:    All Problems (Selected)  Obstructive sleep apnea syndrome, severe / SNOMED CT 203296443 / Confirmed  Obesity / SNOMED CT 6081074392 / Probable  History of COVID-19 / SNOMED CT 5366116354 / Confirmed      Histories   Past Medical History:    Resolved  Inpatient stay (SNOMED CT 926979905): Onset on 5/15/2021 at 46  years.  Resolved on 2021 at 46 years.  Comments:  2021 CDT 10:58 AM CDT - Catalina Lawrence  [facility], [place] - Acute hypoxemic respiratory failure due to COVID-19.  Tobacco user (ICD-9-.1):  Resolved on 2012 at 37 years.   Family History:    Cancer  Grandmother (M)  [place] - Lung cancer  Brother ()     Procedure history:    L Femur Repair on 2004 at 28 Years.   Social History:        Electronic Cigarette/Vaping Assessment            Electronic Cigarette Use: Never.      Alcohol Assessment            1-2 times per week, 4 drinks/episode average.      Tobacco Assessment: Current            Current, Snuff, Started age 18 Years.            Former smoker, quit more than 30 days ago      Substance Abuse Assessment: Denies Substance Abuse            Never      Employment and Education Assessment            Employed, Work/School description: .      Home and Environment Assessment            Marital status: .  Spouse/Partner name: Izabella.  Lives with Children, Spouse.  1 children.  Living               situation: Home/Independent.  Smoker in household: Yes.      Nutrition and Health Assessment            Type of diet: Regular.  Caffeine intake amount: Coffee.      Exercise and Physical Activity Assessment: Regular exercise      Sexual Assessment            Sexually active: Yes.  Sexual orientation: Heterosexual.  Other contraceptive use: IUD.        Physical Examination   Vital Signs   2021 1:21 PM CDT Temperature Tympanic 98.2 DegF    Peripheral Pulse Rate 100 bpm    Respiratory Rate 16 br/min    Systolic Blood Pressure 128 mmHg    Diastolic Blood Pressure 84 mmHg  HI    Mean Arterial Pressure 99 mmHg    BP Site Right arm    BP Method Manual    Oxygen Saturation 98 %      Measurements from flowsheet : Measurements   2021 1:21 PM CDT Height/Length Estimated 69.5 in    Weight Measured - Standard 216 lb      Walked and sats stay in Mid 90's   General:  Alert and oriented.     HENT:  Normocephalic.    Neck:  Supple, Non-tender, No lymphadenopathy.    Respiratory:  Lungs are clear to auscultation.    Cardiovascular:  Normal rate, Regular rhythm.       Impression and Plan   Diagnosis     History of COVID-19 (ZHI43-LD Z86.16).     Muscular deconditioning (XFE26-GG R29.898).     Course:  Not progressing as expected.    Orders     Orders   Requests (Consults / Referrals):  Referral (Request) (Order): 7/22/2021 1:32 PM CDT, Referred to: Pulmonology, Referred to: [facility], Reason for referral: F/U Covid, return to work, ? restrictions., Muscular deconditioning  History of COVID-19.     Orders      Professional Services   Counseling Summary:  This was a 30 minute visit with greater than 50% of that time spent counseling the patient, and coordination of care..    Counseling included treatment options.    The patient was interactive, attentive, and asked questions.

## 2022-02-15 NOTE — NURSING NOTE
Comprehensive Intake Entered On:  6/12/2020 8:50 AM CDT    Performed On:  6/12/2020 8:34 AM CDT by Nery Casiano MA               Summary   Chief Complaint :   Pre-op, Disk repair in back   Weight Measured :   210.2 lb(Converted to: 210 lb 3 oz, 95.35 kg)    Height Measured :   69.5 in(Converted to: 5 ft 9 in, 176.53 cm)    Body Mass Index :   30.59 kg/m2 (HI)    Body Surface Area :   2.16 m2   Systolic Blood Pressure :   138 mmHg   Diastolic Blood Pressure :   86 mmHg   Mean Arterial Pressure :   103 mmHg   Peripheral Pulse Rate :   80 bpm   BP Site :   Right arm   Pulse Site :   Radial artery   BP Method :   Manual   HR Method :   Manual   Nery Casiano MA - 6/12/2020 8:34 AM CDT   Health Status   Allergies Verified? :   Yes   Medication History Verified? :   Yes   Medical History Verified? :   Yes   Pre-Visit Planning Status :   Completed   Tobacco Use? :   Former smoker   Nery Casiano MA - 6/12/2020 8:34 AM CDT   Consents   Consent for Immunization Exchange :   Consent Granted   Consent for Immunizations to Providers :   Consent Granted   Nery Casiano MA - 6/12/2020 8:34 AM CDT   Problems   (As Of: 6/12/2020 8:50:49 AM CDT)   Problems(Active)    Tobacco user (ICD-9-CM  :305.1 )  Name of Problem:   Tobacco user ; Recorder:   SYSTEM; Confirmation:   Probable ; Classification:   Medical ; Code:   305.1 ; Contributor System:   PowerChart ; Last Updated:   2/28/2014 6:57 PM CST ; Life Cycle Date:   5/3/2012 ; Life Cycle Status:   Active ; Vocabulary:   ICD-9-CM          Meds / Allergies   (As Of: 6/12/2020 8:50:49 AM CDT)   Allergies (Active)   No Known Medication Allergies  Estimated Onset Date:   Unspecified ; Created By:   Nery Casiano MA; Reaction Status:   Active ; Category:   Drug ; Substance:   No Known Medication Allergies ; Type:   Allergy ; Updated By:   Nery Casiano MA; Reviewed Date:   6/12/2020 8:49 AM CDT        Medication List   (As Of: 6/12/2020 8:50:49 AM CDT)   No Known Home  Medications     Nery Casiano MA - 6/12/2020 8:49:15 AM           ID Risk Screen   Recent Travel History :   No recent travel   Family Member Travel History :   No recent travel   Other Exposure to Infectious Disease :   Unknown   Nery Casiano MA - 6/12/2020 8:34 AM CDT

## 2022-02-15 NOTE — TELEPHONE ENCOUNTER
---------------------  From: Ariela Simpson CMA (What the Trend Message Pool ([facility]))   To: Referral Coordinators Pool ([address] [place] [address]);     Sent: 6/24/2021 12:54:40 PM CDT  Subject: FW: Sleep Study Order           ---------------------  From: Carl Ron MD   To: What the Trend Message Pool ([address] [place] [address]);     Sent: 6/24/2021 12:53:27 PM CDT  Subject: RE: Sleep Study Order     Home only      ---------------------  From: Ariela Simpson CMA (What the Trend Message Pool ([address] [place] [address]))   To: Carl Ron MD;     Sent: 6/24/2021 12:36:02 PM CDT  Subject: FW: Sleep Study Order           ---------------------  From: Carla Lawrence   To: What the Trend Message Pool ([address] [place] [address]);     Sent: 6/24/2021 12:25:38 PM CDT  Subject: Sleep Study Order     Needing to clarify and edit order, has both Home sleep test checked/ordered and in lab testing checked/ordered please advise.Noted

## 2022-02-15 NOTE — NURSING NOTE
Comprehensive Intake Entered On:  6/24/2021 9:00 AM CDT    Performed On:  6/24/2021 8:54 AM CDT by Ariela Simpson CMA               Summary   Chief Complaint :   Post Covid follow up.   Weight Measured :   214 lb(Converted to: 214 lb 0 oz, 97.069 kg)    Height/Length Estimated :   69.5 in(Converted to: 5 ft 9 in, 176.53 cm)    Systolic Blood Pressure :   130 mmHg   Diastolic Blood Pressure :   68 mmHg   Mean Arterial Pressure :   89 mmHg   Peripheral Pulse Rate :   112 bpm (HI)    BP Site :   Right arm   BP Method :   Manual   Temperature Tympanic :   97.9 DegF(Converted to: 36.6 DegC)    Respiratory Rate :   16 br/min   Oxygen Saturation :   96 %   Ariela Simpson CMA - 6/24/2021 8:54 AM CDT   Health Status   Allergies Verified? :   Yes   Medication History Verified? :   Yes   Medical History Verified? :   Yes   Pre-Visit Planning Status :   Completed   Tobacco Use? :   Former smoker   Ariela Simpson CMA - 6/24/2021 8:54 AM CDT   Consents   Consent for Immunization Exchange :   Consent Granted   Consent for Immunizations to Providers :   Consent Granted   Ariela Simpson CMA - 6/24/2021 8:54 AM CDT   Meds / Allergies   (As Of: 6/24/2021 9:00:32 AM CDT)   Allergies (Active)   No Known Medication Allergies  Estimated Onset Date:   Unspecified ; Created By:   Nery Casiano MA; Reaction Status:   Active ; Category:   Drug ; Substance:   No Known Medication Allergies ; Type:   Allergy ; Updated By:   Nery Casiano MA; Reviewed Date:   6/24/2021 8:56 AM CDT        Medication List   (As Of: 6/24/2021 9:00:32 AM CDT)   No Known Home Medications     Ariela Simpson CMA - 6/24/2021 8:56:56 AM

## 2022-02-15 NOTE — NURSING NOTE
Patient updated regarding positive covid result.  Quarantine Instructions given. Form faxed to [facility].

## 2022-02-15 NOTE — TELEPHONE ENCOUNTER
---------------------  From: Gini Velasquez CMA (Phone Messages Pool ([id number]))   To: [facility] Message Pool ([address] [place] [address]);     Sent: 5/28/2021 11:06:40 AM CDT  Subject: phone note- PT     Phone Message    PCP:    CHT      Time of Call:  11:02am       Person Calling:  Stephan [facility]  Phone number:  [phone]    Note:  States they received order to wean patient off O2. They are unable to do this. Respiratory Therapy can not do as well unless under direction of a physician. Wondering maybe if the patient has 02 monitor at home that he can wean him self off? Please advise.---------------------  From: Ariela Simpson CMA ([facility] Message Pool ([id number]))   To: Carl Ron MD;     Sent: 5/28/2021 11:15:12 AM CDT  Subject: FW: phone note- PT---------------------  From: Carl Ron MD   To: [facility] Message Pool ([id number]);     Sent: 5/28/2021 11:32:52 AM CDT  Subject: RE: phone note- PT     OK, Patient will try.

## 2022-02-15 NOTE — PROGRESS NOTES
Patient:   RACHEL NEVILLE            MRN: [id number]            FIN: [id number]               Age:   46 years     Sex:  Male     :  1975   Associated Diagnoses:   History of COVID-19   Author:   Carl Ron MD      Visit Information      Date of Service: 06/10/2021 10:45 am  Performing Location: [facility]  Encounter#: [id number]      Primary Care Provider (PCP):  Carl Ron MD    NPI# [id number]      Referring Provider:  Carl Ron MD    NPI# [id number]      Chief Complaint   6/10/2021 10:50 AM CDT   Follow up.     Chief complaint and symptoms noted above confirmed with patient.      History of Present Illness             The patient presents with symptoms of an upper respiratory infection.  The severity of the symptoms associated to the upper respiratory infection is extreme.  The timing/course of upper respiratory infection symptoms is constant.  Since getting Covid..  Exacerbating factors consist of none.  Relieving factors consist of rest, fluids, humidified air and Oxygen.  Associated symptoms consist of fatigue and headache.        Review of Systems   Constitutional:  Fever.    Ear/Nose/Mouth/Throat:  Nasal congestion.    Respiratory:  Cough, No wheezing.       Health Status   Allergies:    Allergic Reactions (Selected)  No Known Medication Allergies   Medications:  (Selected)   Documented Medications  Documented  Home Oxygen: Home Oxygen, Instructions: 1.5 Liters per minute. Lenght of need: 2 months, 0 Refill(s), Type: Maintenance  Tums: ( 500 mg ), Chewed, q4 hrs, PRN: indigestion, 0 Refill(s), Type: Maintenance  Tylenol 325 mg oral tablet: = 2 tab(s) ( 650 mg ), Oral, q4 hrs, PRN: for pain, # 120 tab(s), 0 Refill(s), Type: Maintenance  benzonatate 100 mg oral capsule: = 1 cap(s) ( 100 mg ), Oral, tid, PRN: cough, # 30 cap(s), 0 Refill(s), Type: Maintenance  famotidine 20 mg oral tablet: = 1 tab(s) ( 20 mg ), Oral, bid, # 180 tab(s), 0 Refill(s),  Type: Maintenance  guaiFENesin 100 mg/5 mL oral liquid: = 5 mL ( 100 mg ), Oral, qid, PRN: for cough, # 300 mL, 0 Refill(s), Type: Maintenance  zolpidem 5 mg oral tablet: = 1 tab(s) ( 5 mg ), Oral, hs, PRN: for sleep, 0 Refill(s), Type: Maintenance   Problem list:    All Problems (Selected)  Obesity / SNOMED CT 2353757777 / Probable  History of COVID-19 / SNOMED CT 6673016393 / Confirmed      Histories   Past Medical History:    Resolved  Inpatient stay (SNOMED CT 228684651): Onset on 5/15/2021 at 46 years.  Resolved on 2021 at 46 years.  Comments:  2021 CDT 10:58 AM CDT - [facility], [place] - Acute hypoxemic respiratory failure due to COVID-19.  Tobacco user (ICD-9-.1):  Resolved on 2012 at 37 years.   Family History:    Cancer  Grandmother (M)  [place] - Lung cancer  Brother ()     Procedure history:    L Femur Repair on 2004 at 28 Years.   Social History:        Electronic Cigarette/Vaping Assessment            Electronic Cigarette Use: Never.      Alcohol Assessment            1-2 times per week, 4 drinks/episode average.      Tobacco Assessment: Current            Current, Snuff, Started age 18 Years.            Former smoker, quit more than 30 days ago      Substance Abuse Assessment: Denies Substance Abuse            Never      Employment and Education Assessment            Employed, Work/School description: .      Home and Environment Assessment            Marital status: .  Spouse/Partner name: Izabella.  Lives with Children, Spouse.  1 children.  Living               situation: Home/Independent.  Smoker in household: Yes.      Nutrition and Health Assessment            Type of diet: Regular.  Caffeine intake amount: Coffee.      Exercise and Physical Activity Assessment: Regular exercise      Sexual Assessment            Sexually active: Yes.  Sexual orientation: Heterosexual.  Other contraceptive use: IUD.        Physical Examination   Vital Signs    6/10/2021 10:50 AM CDT Temperature Tympanic 98.1 DegF    Peripheral Pulse Rate 91 bpm    Respiratory Rate 20 br/min    Systolic Blood Pressure 112 mmHg    Diastolic Blood Pressure 72 mmHg    Mean Arterial Pressure 85 mmHg    BP Site Right arm    BP Method Manual    Oxygen Saturation 98 %      Measurements from flowsheet : Measurements   6/10/2021 10:50 AM CDT Height/Length Estimated 69.5 in    Weight Measured - Standard 211 lb      Walked and sats went into high 80's   General:  Alert and oriented.    HENT:  Normocephalic.    Neck:  Supple, Non-tender, No lymphadenopathy.    Respiratory:  Lungs are clear to auscultation.    Cardiovascular:  Normal rate, Regular rhythm.       Impression and Plan   Diagnosis     History of COVID-19 (NWK27-YS Z86.16).     Course:  Progressing as expected.    Orders     Orders   Requests (Return to Office):  Return to Clinic (Request) (Order): Return in 2 weeks.

## 2022-02-15 NOTE — RESULTS
-------------------------------- Original Report -------------------------------    EXAM:  MR LUMBAR SPINE WITHOUT CONTRAST       CLINICAL INFORMATION:  Low back and left leg pain with numbness to the left  midfoot through T2-3 weeks.    COMPARISON:  None  CONTRAST:  None.  SEDATION:  None.    TECHNICAL INFORMATION:  The examination was performed on the 3T MRI scanner.  T1  and T2 sagittal/axial and sagittal T2 fat sat images were obtained.    INTERPRETATION:              L5-S1:  Severe disc space narrowing, osteophyte and bulge without central  stenosis, hypertrophic facet joint degeneration and mild left foraminal  stenosis.    L4-5: Severe degeneration and a large caudally extruded left central to  posterolateral disc herniation measures 9.5 mm AP by 8.5 mm SI by 14 mm  transverse.  This severely compresses the left L5 root and mildly deforms the  dural sac.  Mild right L5 root contact.  Foramina appear patent and facet joints  are unremarkable.    L3-4: Moderate to advanced disc degeneration, disc bulge and central to left  central disc extrusion flattens the dural sac with mild to moderate subarticular  L4 impingement/mild central stenosis.  Ganglia exit without compromise.    L2-3: Disc bulge without central stenosis and patent nerve root canals.  Facet  joints are unremarkable.    L1-2: Mild degeneration without central or lateral stenosis and facet joints are  unremarkable.    T11-12 and T12-L1: No bulge, herniation or stenosis and patent nerve root  canals.  Facet joints are unremarkable.      Osseous Structures:       Fat suppressed images are negative for acute or subacute fractures.              Paraspinous Soft Tissues:      No mass lesions.             Conus, Cord and Cauda Equina:       Normal position conus and no evidence of intradural mass or arachnoiditis.            CONCLUSION:  Multilevel spondylosis, no fractures or destructive lesions and  significant findings are as follows:  1.  Left disc  extrusion at L4-5 compresses the left L5 root.  2.  Bulge/protrusion at L3-4 causes mild central and moderate subarticular  stenosis with L4 impingement.  3.  Central chronic protrusion at L5-S1 without central or lateral stenosis.  4.  Multilevel hypertrophic facet joint degeneration, as reported.         Read by: Matthias Mast M.D.  Reviewed and Electronically Signed by: Matthias Mast M.D.

## 2022-02-15 NOTE — LETTER
(Inserted Image. Unable to display)                                                                                                                                                        July 26, 2021Re: RACHEL [id number]  [facility]  [address] [place], [place] [address]Wm:   [facility] HospitalThe following patient has been referred to your office/practice:  RACHEL SHETHJURGENNORYVALENTIN Appointment:  Appointment is PendingLocation:  [facility] refer to the attached  clinical documentation for a summary of RACHEL's care.  Please do not hesitate to contact our office if any additional clinical questions arise.  All relevant records and transition of care documents should be mailed or faxed.Your assistance in providing continuity of care is appreciated. Sincerely, [address] [address](P) [phone](F) [phone]

## 2022-02-15 NOTE — TELEPHONE ENCOUNTER
---------------------  From: Carol Lewis CMA (Phone Messages Pool ([id number]_[facility]))   To: Phone Messages Pool ([id number]_[place] [facility] [place]);     Sent: 6/15/2020 11:07:04 AM CDT  Subject: Phone Message- preop missing info     Phone Message    PCP:   CHT      Time of Call:  1020       Person Calling:  Stephanie from [facility].  Phone number:  [phone]  Fax number: [phone]  Note:   Preop paperwork missing height and weight. Will re-fax later today with missing vitals.    Last office visit and reason:  06/12/2020 Preop CHT

## 2022-02-15 NOTE — NURSING NOTE
Comprehensive Intake Entered On:  7/27/2021 3:58 PM CDT    Performed On:  7/27/2021 3:53 PM CDT by Fransisca Arriaza               Summary   Chief Complaint :   Patient here to f/u HST.    Menstrual Status :   N/A   Weight Measured :   216 lb(Converted to: 216 lb 0 oz, 97.976 kg)    Height Measured :   70.25 in(Converted to: 5 ft 10 in, 178.43 cm)    Body Mass Index :   30.77 kg/m2 (HI)    Body Surface Area :   2.2 m2   Height/Length Estimated :   69.5 in(Converted to: 5 ft 9 in, 176.53 cm)    Systolic Blood Pressure :   121 mmHg   Diastolic Blood Pressure :   77 mmHg   Mean Arterial Pressure :   92 mmHg   Peripheral Pulse Rate :   96 bpm   BP Site :   Right arm   BP Method :   Electronic   HR Method :   Electronic   Oxygen Saturation :   96 %   Fransisca Arriaza - 7/27/2021 3:53 PM CDT   Health Status   Allergies Verified? :   Yes   Medication History Verified? :   Yes   Medical History Verified? :   Yes   Pre-Visit Planning Status :   Completed   Tobacco Use? :   Former smoker   Fransisca Arriaza - 7/27/2021 3:53 PM CDT   Consents   Consent for Immunization Exchange :   Consent Granted   Consent for Immunizations to Providers :   Consent Granted   Fransisca Arriaza - 7/27/2021 3:53 PM CDT   Meds / Allergies   (As Of: 7/27/2021 3:58:03 PM CDT)   Allergies (Active)   No Known Medication Allergies  Estimated Onset Date:   Unspecified ; Created By:   Nery Casiano MA; Reaction Status:   Active ; Category:   Drug ; Substance:   No Known Medication Allergies ; Type:   Allergy ; Updated By:   Nery Casiano MA; Reviewed Date:   7/27/2021 3:55 PM CDT        Medication List   (As Of: 7/27/2021 3:58:03 PM CDT)   Prescription/Discharge Order    Miscellaneous Rx Supply  :   Miscellaneous Rx Supply ; Status:   Prescribed ; Ordered As Mnemonic:   D/C Oxygen ; Simple Display Line:   See Instructions, DISCONTINUE Oxygen, 1 EA, 0 Refill(s) ; Ordering Provider:   Carl Ron MD; Catalog Code:   Miscellaneous Rx  Supply ; Order Dt/Tm:   7/8/2021 9:57:41 AM CDT

## 2022-02-15 NOTE — NURSING NOTE
Comprehensive Intake Entered On:  7/8/2021 9:42 AM CDT    Performed On:  7/8/2021 9:37 AM CDT by Ariela Simpson CMA               Summary   Chief Complaint :   Post covid follow up   Menstrual Status :   N/A   Weight Measured :   220 lb(Converted to: 220 lb 0 oz, 99.790 kg)    Height Measured :   70.25 in(Converted to: 5 ft 10 in, 178.43 cm)    Body Mass Index :   31.34 kg/m2 (HI)    Body Surface Area :   2.22 m2   Systolic Blood Pressure :   128 mmHg   Diastolic Blood Pressure :   82 mmHg (HI)    Mean Arterial Pressure :   97 mmHg   Peripheral Pulse Rate :   103 bpm (HI)    BP Site :   Right arm   BP Method :   Manual   Temperature Tympanic :   97.5 DegF(Converted to: 36.4 DegC)  (LOW)    Respiratory Rate :   16 br/min   Oxygen Saturation :   97 %   Ariela Simpson CMA - 7/8/2021 9:37 AM CDT   Health Status   Allergies Verified? :   Yes   Medication History Verified? :   Yes   Medical History Verified? :   Yes   Pre-Visit Planning Status :   Completed   Tobacco Use? :   Former smoker   Ariela Simpson CMA - 7/8/2021 9:37 AM CDT   Consents   Consent for Immunization Exchange :   Consent Granted   Consent for Immunizations to Providers :   Consent Granted   Ariela Simpson CMA - 7/8/2021 9:37 AM CDT   Meds / Allergies   (As Of: 7/8/2021 9:42:55 AM CDT)   Allergies (Active)   No Known Medication Allergies  Estimated Onset Date:   Unspecified ; Created By:   Nery Casiano MA; Reaction Status:   Active ; Category:   Drug ; Substance:   No Known Medication Allergies ; Type:   Allergy ; Updated By:   Nery Casiano MA; Reviewed Date:   7/8/2021 9:42 AM CDT        Medication List   (As Of: 7/8/2021 9:42:55 AM CDT)   No Known Home Medications     Ariela Simpson CMA - 7/8/2021 9:40:19 AM

## 2022-02-15 NOTE — PROGRESS NOTES
Patient:   RACHEL NEVILLE            MRN: [id number]            FIN: [id number]               Age:   46 years     Sex:  Male     :  1975   Associated Diagnoses:   History of COVID-19   Author:   Carl Ron MD      Chief Complaint   2021 8:54 AM CDT    Post Covid follow up.   Chief complaint and symptoms noted above confirmed with patient.      History of Present Illness             The patient presents with dyspnea.  The dyspnea is described as breathlessness on exertion.  The severity of the dyspnea is moderate.  The timing/course of symptom(s) associated to dyspnea is episodic and is improving.  follow up Covid.  Exacerbating factors consist of none.  Relieving factors consist of rest and off oxygen during day but still using at night..        Review of Systems   Constitutional:  Negative.    Respiratory:  Negative except as documented in history of present illness.    Cardiovascular:  Negative.    Gastrointestinal:  Negative.       Health Status   Allergies:    Allergic Reactions (Selected)  No Known Medication Allergies   Problem list:    All Problems (Selected)  Obesity / SNOMED CT 9004492263 / Probable  History of COVID-19 / SNOMED CT 9755721338 / Confirmed      Histories   Past Medical History:    Resolved  Inpatient stay (SNOMED CT 166647255): Onset on 5/15/2021 at 46 years.  Resolved on 2021 at 46 years.  Comments:  2021 CDT 10:58 AM CDT - [facility], [place] - Acute hypoxemic respiratory failure due to COVID-19.  Tobacco user (ICD-9-.1):  Resolved on 2012 at 37 years.   Family History:    Cancer  Grandmother (M)  [place] - Lung cancer  Brother ()     Procedure history:    L Femur Repair on 2004 at 28 Years.      Physical Examination   Vital Signs   2021 8:54 AM CDT Temperature Tympanic 97.9 DegF    Peripheral Pulse Rate 112 bpm  HI    Respiratory Rate 16 br/min    Systolic Blood Pressure 130 mmHg    Diastolic Blood Pressure 68 mmHg     Mean Arterial Pressure 89 mmHg    BP Site Right arm    BP Method Manual    Oxygen Saturation 96 %      Measurements from flowsheet : Measurements   6/24/2021 8:54 AM CDT Height/Length Estimated 69.5 in    Weight Measured - Standard 214 lb      Documented vital signs:       Oxygen saturation  91  %: FiO2  21  %, Room air, after long walk.    HENT:  Normocephalic, Tympanic membranes are clear.    Respiratory:  Lungs are clear to auscultation, Respirations are non-labored.    Cardiovascular:  Normal rate.       Impression and Plan   Diagnosis     History of COVID-19 (NDO46-IB Z86.16).     Course:  Improving.    Patient Instructions:       Counseled: Patient, Family, Regarding diagnosis, Regarding treatment, Verbalized understanding.    Diagnosis     History of COVID-19 (LQZ22-TW Z86.16).     More than half of a 30 minute visit spent on counseling on above problem..     Course:  Progressing as expected.    Orders     Orders   Requests (Return to Office):  Return to Clinic (Request) (Order): Return in 2 weeks.

## 2022-02-15 NOTE — NURSING NOTE
Comprehensive Intake Entered On:  5/14/2021 9:04 AM CDT    Performed On:  5/14/2021 9:02 AM CDT by Ariela Simpson CMA               Summary   Chief Complaint :   Insomnia x6 days. Left side sciatic pain. Multiple covid symptoms marked on screening form.    Ariela Simpson CMA - 5/14/2021 9:02 AM CDT   Health Status   Allergies Verified? :   Yes   Medication History Verified? :   Yes   Medical History Verified? :   Yes   Pre-Visit Planning Status :   Completed   Tobacco Use? :   Former smoker   Ariela Simpson CMA - 5/14/2021 9:02 AM CDT   Meds / Allergies   (As Of: 5/14/2021 9:04:34 AM CDT)   Allergies (Active)   No Known Medication Allergies  Estimated Onset Date:   Unspecified ; Created By:   Nery Casiano MA; Reaction Status:   Active ; Category:   Drug ; Substance:   No Known Medication Allergies ; Type:   Allergy ; Updated By:   Nery Casiano MA; Reviewed Date:   5/14/2021 9:04 AM CDT        Medication List   (As Of: 5/14/2021 9:04:34 AM CDT)   No Known Home Medications     Ariela Simpson CMA - 5/14/2021 9:04:23 AM           ID Risk Screen   Recent Travel History :   No recent travel   Family Member Travel History :   No recent travel   Other Exposure to Infectious Disease :   Unknown   COVID-19 Testing Status :   No positive COVID-19 test   Ariela Simpson CMA - 5/14/2021 9:02 AM CDT   Social History   Social History   (As Of: 5/14/2021 9:04:34 AM CDT)   Alcohol:        1-2 times per week, 4 drinks/episode average.   (Last Updated: 5/8/2012 8:57:32 AM CDT by Catalina Lawrence)          Tobacco:  Current      Current, Snuff, Started age 18 Years.   Comments:  5/3/2012 3:29 PM - Chyna Zavala CMA: Had stopped for 7 years & restarted   (Last Updated: 5/3/2012 3:29:15 PM CDT by Chyna Zavala CMA)   Former smoker, quit more than 30 days ago   (Last Updated: 5/14/2021 9:03:58 AM CDT by Ariela Simpson CMA)          Electronic Cigarette/Vaping:        Electronic Cigarette Use: Never.   (Last Updated: 5/14/2021 9:04:02 AM  CDT by Ariela Simpson CMA)          Substance Abuse:  Denies Substance Abuse      Never   (Last Updated: 5/8/2012 8:57:40 AM CDT by Catalina Lawrence)          Employment/School:        Employed, Work/School description: [employer].   (Last Updated: 5/3/2012 3:29:24 PM CDT by Chyna Zavala CMA)          Home/Environment:        Marital status: .  Spouse/Partner name: Izabella.  Lives with Children, Spouse.  1 children.  Living situation: Home/Independent.  Smoker in household: Yes.   (Last Updated: 5/3/2012 3:29:57 PM CDT by Chyna Zavala CMA)          Nutrition/Health:        Type of diet: Regular.  Caffeine intake amount: Coffee.   (Last Updated: 5/3/2012 3:30:16 PM CDT by Chyna Zavala CMA)          Exercise:  Regular exercise      (Last Updated: 5/3/2012 3:30:23 PM CDT by Chyna Zavala CMA )         Sexual:        Sexually active: Yes.  Sexual orientation: Heterosexual.  Other contraceptive use: IUD.   (Last Updated: 5/8/2012 8:58:10 AM CDT by Catalina Lawrence)

## 2022-02-15 NOTE — PROGRESS NOTES
Chief Complaint    Patient here to f/u HST.  History of Present Illness       Rachel is here for follow-up of a home sleep test.  He is a lifelong snorer.  Had Covid with an 8-day hospitalization earlier this year.  He has chronic daytime sleepiness with an Salem score of 10.  Gasping and witnessed apneas.  No leg symptoms.  Sometimes has difficulty falling asleep.  Goes to bed at 8 or 9 as he is up at 3:30 in the morning for work.  No nocturia, headache, edema, hypertension, heartburn.  Review of Systems       No recent change in weight.  No fever or chills.  No sputum production.  No chest pain.  Physical Exam   Vitals & Measurements    HR: 96 (Peripheral)  BP: 121/77  SpO2: 96%     HT: 70.25 in  HT: 69.5 in  WT: 216 lb  BMI: 30.77        Patient appears comfortable and in no distress.  Alert and oriented.  In good spirits.  HEENT exam is Mallampati 1.  Neck supple no adenopathy or thyromegaly.  Chest clear.  Cardiac regular no murmur.  No edema.  Assessment/Plan       Obesity (Probable) (E66.9)          Encouraged weight loss.         Ordered:          64625 office o/p est low 20-29 min (Charge), Quantity: 1, Obstructive sleep apnea syndrome, severe  Obesity                Obstructive sleep apnea syndrome, severe (G47.33)         Patient has severe symptomatic obstructive sleep apnea.  Discussed all of the treatment modalities for sleep apnea.  Discussed in lab versus at home Pap titration and he prefers the latter of these.         Ordered:          Miscellaneous Rx Supply, AUTOPAP 6-20 cm water pressure, See Instructions, Instructions: Heated humidifier, heated tubing, filters, nasal or full face mask of choice with headgear. Replacement cushions and supplies as needed. Change supplies every 6 mos Months = 99 (Lifeti..., (Ordered)          93682 office o/p est low 20-29 min (Charge), Quantity: 1, Obstructive sleep apnea syndrome, severe  Obesity           Patient Information     Name:RACHEL NEVILLE       Address:      [address] [place] [id number]     Sex:Male     YOB: 1975     Phone:[phone]     Emergency Contact:CAREY NEVILLE     MRN:[id number]     FIN:[id number]     Location:[facility]     Date of Service:07/27/2021      Primary Care Physician:       Ariadne SNEED, Carl, [phone]      Attending Physician:       Edd Marie MD, [phone]  Problem List/Past Medical History    Ongoing     History of COVID-19     Obesity     Obstructive sleep apnea syndrome, severe    Historical     Inpatient stay       Comments: [facility], [place] - Acute hypoxemic respiratory failure due to COVID-19.     Tobacco user  Procedure/Surgical History     L Femur Repair (02/14/2004)  Medications    AUTOPAP 6-20 cm water pressure, See Instructions, 11 refills    D/C Oxygen, See Instructions  Allergies    No Known Medication Allergies  Social History    Smoking Status     Former smoker     Alcohol      1-2 times per week, 4 drinks/episode average.     Electronic Cigarette/Vaping      Electronic Cigarette Use: Never.     Employment/School      Employed, Work/School description: .     Exercise - Regular exercise     Home/Environment      Marital status: . Spouse/Partner name: Carey. Lives with Children, Spouse. 1 children. Living situation: Home/Independent. Smoker in household: Yes.     Nutrition/Health      Type of diet: Regular. Caffeine intake amount: Coffee.     Sexual      Sexually active: Yes. Sexual orientation: Heterosexual. Other contraceptive use: IUD.     Substance Abuse - Denies Substance Abuse      Never     Tobacco - Current      Former smoker, quit more than 30 days ago  Family History    Atrial fibrillation: Father.    CA - Lung cancer: Brother.    Cancer: Grandmother (M).    Sleep apnea syndrome: Father.  Lab Results          Lab Results (Last 4 results within 90 days)           Sodium Level: 136 mmol/L [135 mmol/L - 146 mmol/L] (05/27/21 17:55:00)           Potassium Level: 4.3 mmol/L [3.5 mmol/L - 5.3 mmol/L] (05/27/21 17:55:00)          Chloride Level: 98 mmol/L [98 mmol/L - 110 mmol/L] (05/27/21 17:55:00)          CO2 Level: 28 mmol/L [20 mmol/L - 32 mmol/L] (05/27/21 17:55:00)          Glucose Level: 131 mg/dL High [65 mg/dL - 99 mg/dL] (05/27/21 17:55:00)          BUN: 16 mg/dL [7 mg/dL - 25 mg/dL] (05/27/21 17:55:00)          Creatinine Level: 0.8 mg/dL [0.6 mg/dL - 1.35 mg/dL] (05/27/21 17:55:00)          BUN/Creat Ratio: NOT APPLICABLE [6  - 22] (05/27/21 17:55:00)          eGFR: 109 mL/min/1.73m2 (05/27/21 17:55:00)          eGFR African American: 126 mL/min/1.73m2 (05/27/21 17:55:00)          Calcium Level: 9.2 mg/dL [8.6 mg/dL - 10.3 mg/dL] (05/27/21 17:55:00)          Coronavirus SARS-CoV-2 (COVID-19) TR: Positive (05/14/21 10:00:00)  Immunizations          Scheduled Immunizations          Dose Date(s)          influenza virus vaccine, inactivated          11/03/2016          tetanus/diphth/pertuss (Tdap) adult/adol          11/04/2008          Other Immunizations          Td          01/01/2011          SARS-CoV-2 (COVID-19) Moderna-1273          07/08/2021  Diagnostic Results    Home sleep test revealed an FRANCHESCA of 43.2, YASIR of 56.5, and oximetry franky of 78%.

## 2022-02-15 NOTE — TELEPHONE ENCOUNTER
---------------------  From: Leslie Drew RN   To: [place] Pool ([id number]);     Sent: 7/12/2021 8:36:36 AM CDT  Subject: FYI Follow up Covid     Time of Call:  0720  Return call at:0800     Person Calling:  patient  Phone number:  [phone]    Note:   Patient is feeling he had side effects from the Covid Vaccine which he had last Thursday on 7/8/21. On Friday he felt fatigue and noticed some swelling of axillary  lymph nodes on the side he got his vaccine. On Saturday after walking to the car and back to the house his O2 Sat dropped to  87%. He had chills during the night on Friday and then sweat in his sheets. He feels well today, his O2 sat is 95% His temp this AM is 97.5. He has had some chest pressure with deep breaths since hospitalization with pneumonia/ Covid.  Denies SOB today. He will continue a watchful wait. If he feels chest pain or pressure he will be be seen in ER. If he has a return of SOB, will be seen likewise clinic or ER. He feels like he does not need his O2 any longer. He is going to take it easy today. He will monitor temp and O2 sat.     Last office visit and reason:  7/8/21 follow up Covid---------------------  From: Ariela Simpson CMA ([employer] ([id number]))   To: Carl Ron MD;     Sent: 7/16/2021 12:12:13 PM CDT  Subject: FW: FYI Follow up Covid     FYI---------------------  From: Carl Ron MD   To: [place] Pool ([id number]);     Sent: 7/16/2021 1:19:53 PM CDT  Subject: RE: FYI Follow up Covid     I agree with plan.

## 2022-02-15 NOTE — NURSING NOTE
Comprehensive Intake Entered On:  8/5/2021 1:49 PM CDT    Performed On:  8/5/2021 1:41 PM CDT by Ariela Simpson CMA               Summary   Chief Complaint :   Post covid follow up.    Menstrual Status :   N/A   Weight Measured :   215.6 lb(Converted to: 215 lb 10 oz, 97.794 kg)    Height/Length Estimated :   69.5 in(Converted to: 5 ft 9 in, 176.53 cm)    Systolic Blood Pressure :   122 mmHg   Diastolic Blood Pressure :   90 mmHg (HI)    Mean Arterial Pressure :   101 mmHg   Peripheral Pulse Rate :   94 bpm   BP Site :   Right arm   BP Method :   Manual   Temperature Tympanic :   97.9 DegF(Converted to: 36.6 DegC)    Respiratory Rate :   20 br/min   Oxygen Saturation :   97 %   Ariela Simpson CMA - 8/5/2021 1:41 PM CDT   Health Status   Allergies Verified? :   Yes   Medication History Verified? :   Yes   Medical History Verified? :   Yes   Pre-Visit Planning Status :   Completed   Tobacco Use? :   Former smoker   Ariela Simpson CMA - 8/5/2021 1:41 PM CDT   Consents   Consent for Immunization Exchange :   Consent Granted   Consent for Immunizations to Providers :   Consent Granted   Ariela Simpson CMA - 8/5/2021 1:41 PM CDT   Meds / Allergies   (As Of: 8/5/2021 1:49:30 PM CDT)   Allergies (Active)   No Known Medication Allergies  Estimated Onset Date:   Unspecified ; Created By:   Nery Casiano MA; Reaction Status:   Active ; Category:   Drug ; Substance:   No Known Medication Allergies ; Type:   Allergy ; Updated By:   Nery Casiano MA; Reviewed Date:   8/5/2021 1:45 PM CDT        Medication List   (As Of: 8/5/2021 1:49:30 PM CDT)   Prescription/Discharge Order    Miscellaneous Rx Supply  :   Miscellaneous Rx Supply ; Status:   Prescribed ; Ordered As Mnemonic:   AUTOPAP 6-20 cm water pressure ; Simple Display Line:   See Instructions, Heated humidifier, heated tubing, filters, nasal or full face mask of choice with headgear.  Replacement cushions and supplies as needed.  Change supplies every 6 mos  Months = 99  (Lifetime), 1 EA, 11 Refill(s) ; Ordering Provider:   Edd Marie MD; Catalog Code:   Miscellaneous Rx Supply ; Order Dt/Tm:   7/27/2021 4:19:12 PM CDT          Miscellaneous Rx Supply  :   Miscellaneous Rx Supply ; Status:   Prescribed ; Ordered As Mnemonic:   D/C Oxygen ; Simple Display Line:   See Instructions, DISCONTINUE Oxygen, 1 EA, 0 Refill(s) ; Ordering Provider:   Carl Ron MD; Catalog Code:   Miscellaneous Rx Supply ; Order Dt/Tm:   7/8/2021 9:57:41 AM CDT            [facility] Meds    SARS-CoV-2 (COVID-19) mRNA-1273 vaccine  :   SARS-CoV-2 (COVID-19) mRNA-1273 vaccine ; Status:   Completed ; Ordered As Mnemonic:   Moderna COVID-19 Vaccine ; Simple Display Line:   0.5 mL, IM, once ; Ordering Provider:   Carl Ron MD; Catalog Code:   SARS-CoV-2 (COVID-19) mRNA-1273 vaccine ; Order Dt/Tm:   8/5/2021 1:33:51 PM CDT